# Patient Record
Sex: MALE | Race: WHITE | NOT HISPANIC OR LATINO | Employment: OTHER | URBAN - METROPOLITAN AREA
[De-identification: names, ages, dates, MRNs, and addresses within clinical notes are randomized per-mention and may not be internally consistent; named-entity substitution may affect disease eponyms.]

---

## 2017-02-10 ENCOUNTER — LAB VISIT (OUTPATIENT)
Dept: LAB | Facility: HOSPITAL | Age: 52
End: 2017-02-10
Attending: PSYCHIATRY & NEUROLOGY
Payer: COMMERCIAL

## 2017-02-10 ENCOUNTER — OFFICE VISIT (OUTPATIENT)
Dept: NEUROLOGY | Facility: CLINIC | Age: 52
End: 2017-02-10
Payer: COMMERCIAL

## 2017-02-10 VITALS
WEIGHT: 247.81 LBS | HEIGHT: 70 IN | SYSTOLIC BLOOD PRESSURE: 140 MMHG | BODY MASS INDEX: 35.48 KG/M2 | RESPIRATION RATE: 16 BRPM | DIASTOLIC BLOOD PRESSURE: 80 MMHG | HEART RATE: 88 BPM

## 2017-02-10 DIAGNOSIS — R94.01 ABNORMAL EEG: ICD-10-CM

## 2017-02-10 LAB
ALBUMIN SERPL BCP-MCNC: 3.8 G/DL
ALP SERPL-CCNC: 57 U/L
ALT SERPL W/O P-5'-P-CCNC: 44 U/L
ANION GAP SERPL CALC-SCNC: 8 MMOL/L
AST SERPL-CCNC: 26 U/L
BASOPHILS # BLD AUTO: 0.04 K/UL
BASOPHILS NFR BLD: 0.6 %
BILIRUB SERPL-MCNC: 0.4 MG/DL
BUN SERPL-MCNC: 10 MG/DL
CALCIUM SERPL-MCNC: 8.7 MG/DL
CHLORIDE SERPL-SCNC: 104 MMOL/L
CO2 SERPL-SCNC: 25 MMOL/L
CREAT SERPL-MCNC: 1.1 MG/DL
DIFFERENTIAL METHOD: NORMAL
EOSINOPHIL # BLD AUTO: 0.1 K/UL
EOSINOPHIL NFR BLD: 1.3 %
ERYTHROCYTE [DISTWIDTH] IN BLOOD BY AUTOMATED COUNT: 13.2 %
EST. GFR  (AFRICAN AMERICAN): >60 ML/MIN/1.73 M^2
EST. GFR  (NON AFRICAN AMERICAN): >60 ML/MIN/1.73 M^2
GLUCOSE SERPL-MCNC: 140 MG/DL
HCT VFR BLD AUTO: 43.3 %
HGB BLD-MCNC: 14.7 G/DL
LYMPHOCYTES # BLD AUTO: 1.6 K/UL
LYMPHOCYTES NFR BLD: 24 %
MCH RBC QN AUTO: 28.1 PG
MCHC RBC AUTO-ENTMCNC: 33.9 %
MCV RBC AUTO: 83 FL
MONOCYTES # BLD AUTO: 0.6 K/UL
MONOCYTES NFR BLD: 9.1 %
NEUTROPHILS # BLD AUTO: 4.4 K/UL
NEUTROPHILS NFR BLD: 65 %
PLATELET # BLD AUTO: 333 K/UL
PMV BLD AUTO: 10.4 FL
POTASSIUM SERPL-SCNC: 4.1 MMOL/L
PROT SERPL-MCNC: 8 G/DL
RBC # BLD AUTO: 5.23 M/UL
SODIUM SERPL-SCNC: 137 MMOL/L
TSH SERPL DL<=0.005 MIU/L-ACNC: 2.1 UIU/ML
WBC # BLD AUTO: 6.74 K/UL

## 2017-02-10 PROCEDURE — 99214 OFFICE O/P EST MOD 30 MIN: CPT | Mod: S$GLB,,, | Performed by: PSYCHIATRY & NEUROLOGY

## 2017-02-10 PROCEDURE — 36415 COLL VENOUS BLD VENIPUNCTURE: CPT

## 2017-02-10 PROCEDURE — 3079F DIAST BP 80-89 MM HG: CPT | Mod: S$GLB,,, | Performed by: PSYCHIATRY & NEUROLOGY

## 2017-02-10 PROCEDURE — 99999 PR PBB SHADOW E&M-EST. PATIENT-LVL III: CPT | Mod: PBBFAC,,, | Performed by: PSYCHIATRY & NEUROLOGY

## 2017-02-10 PROCEDURE — 80053 COMPREHEN METABOLIC PANEL: CPT

## 2017-02-10 PROCEDURE — 84443 ASSAY THYROID STIM HORMONE: CPT

## 2017-02-10 PROCEDURE — 85025 COMPLETE CBC W/AUTO DIFF WBC: CPT

## 2017-02-10 PROCEDURE — 3077F SYST BP >= 140 MM HG: CPT | Mod: S$GLB,,, | Performed by: PSYCHIATRY & NEUROLOGY

## 2017-02-10 RX ORDER — LOSARTAN POTASSIUM 100 MG/1
50 TABLET ORAL DAILY
COMMUNITY
Start: 2017-01-24 | End: 2021-11-05

## 2017-02-10 RX ORDER — METFORMIN HYDROCHLORIDE 1000 MG/1
1000 TABLET ORAL 2 TIMES DAILY WITH MEALS
COMMUNITY
Start: 2017-01-12 | End: 2021-11-05

## 2017-02-10 RX ORDER — OXCARBAZEPINE 300 MG/1
300 TABLET, FILM COATED ORAL 2 TIMES DAILY
Qty: 180 TABLET | Refills: 3 | Status: SHIPPED | OUTPATIENT
Start: 2017-02-10 | End: 2018-03-01 | Stop reason: SDUPTHER

## 2017-02-10 RX ORDER — EMPAGLIFLOZIN AND LINAGLIPTIN 10; 5 MG/1; MG/1
1 TABLET, FILM COATED ORAL DAILY
COMMUNITY
Start: 2017-01-17 | End: 2017-03-10 | Stop reason: SDUPTHER

## 2017-02-10 RX ORDER — ASPIRIN 81 MG/1
81 TABLET ORAL DAILY
COMMUNITY
End: 2021-11-05 | Stop reason: SDUPTHER

## 2017-02-10 NOTE — MR AVS SNAPSHOT
[facility]  [address] [facility] [address]  Phone: [phone]  Fax: [phone]                  Georges Carrillo   2/10/2017 8:45 AM   Office Visit    Description:  Male : 1965   Provider:  Payam Us MD   Department:  [facility]           Reason for Visit     Neurologic Problem           Diagnoses this Visit        Comments    Spells    -  Primary     Abnormal EEG                To Do List           Future Appointments        Provider Department Dept Phone    2/10/2017 1:00 PM LAB, [facility] [phone]      Goals (5 Years of Data)     None      Follow-Up and Disposition     Return in about 1 year (around 2/10/2018).      [facility]     [facility] -  Assistance  Registered nurses in the [facility] provide clinical advisement, health education, appointment booking, and other advisory services.  Call for this free service at [phone].             Medications                Verify that the below list of medications is an accurate representation of the medications you are currently taking.  If none reported, the list may be blank. If incorrect, please contact your healthcare provider. Carry this list with you in case of emergency.           Current Medications     amlodipine (NORVASC) 10 MG tablet Take 10 mg by mouth once daily.    aspirin (ECOTRIN) 81 MG EC tablet Take 81 mg by mouth once daily.    diazepam (VALIUM) 5 MG tablet Take 5 mg by mouth every 12 (twelve) hours as needed for Anxiety.    GLYXAMBI 10-5 mg Tab Take 1 tablet by mouth once daily.     hydrocodone-acetaminophen 10-325mg (NORCO)  mg Tab Take 1 tablet by mouth every 6 to 8 hours as needed.    losartan (COZAAR) 100 MG tablet Take 100 mg by mouth once daily.     metformin (GLUCOPHAGE) 1000 MG tablet Take 1,000 mg by mouth 2 (two) times daily with meals.     oxcarbazepine (TRILEPTAL) 300 MG  "Tab TAKE ONE TABLET BY MOUTH TWICE DAILY    tramadol (ULTRAM) 50 mg tablet Take 100 mg by mouth 2 (two) times daily as needed for Pain.            Clinical Reference Information           Your Vitals Were     BP Pulse Resp Height Weight BMI    140/80 (BP Location: Right arm, Patient Position: Sitting, BP Method: Manual) 88 16 5' 10" (1.778 m) 112.4 kg (247 lb 12.8 oz) 35.56 kg/m2      Blood Pressure          Most Recent Value    BP  (!)  140/80      Allergies as of 2/10/2017     No Known Allergies      Immunizations Administered on Date of Encounter - 2/10/2017     None      Orders Placed During Today's Visit     Future Labs/Procedures Expected by Expires    CBC auto differential  2/10/2017 4/11/2018    Comprehensive metabolic panel  2/10/2017 2/10/2018    TSH  2/10/2017 4/11/2018      Language Assistance Services     ATTENTION: Language assistance services are available, free of charge. Please call [phone].      ATENCIÓN: Si habla español, tiene a chery disposición servicios gratuitos de asistencia lingüística. Llame al [phone].     CHÚ Ý: N?u b?n nói Ti?ng Vi?t, có các d?ch v? h? tr? ngôn ng? mi?n phí dành cho b?n. G?i s? [phone].         [facility] complies with applicable Federal civil rights laws and does not discriminate on the basis of race, color, national origin, age, disability, or sex.        "

## 2017-02-10 NOTE — PROGRESS NOTES
HPI:Georges Carrillo is a 50 y.o. male with a few spells of LOC over several years, with warning and one with witnessed tonic-clonic activity. Most recent spell 6/2014. Work up revealed  normal MRI brain EEG showed intermittent left temporal slowing possibly suggesting seizure disorder .    Since the last visit, no seizures, no staring spells and no loss of time.  He continues to be compliant with Triletpal BID  Follows A1C and lipids with endocrinology      Review of Systems   Constitutional: Negative for fever.   Eyes: Negative for double vision.   Respiratory: Negative for hemoptysis.    Cardiovascular: Negative for leg swelling.   Gastrointestinal: Negative for blood in stool.   Genitourinary: Negative for hematuria.   Musculoskeletal: Negative for falls.   Skin: Negative for rash.   Neurological: Negative for seizures, loss of consciousness and headaches.   Psychiatric/Behavioral: Negative for memory loss.             Exam:  Gen Appearance, well developed/nourished in no apparent distress  CV: 2+ distal pulses with no edema or swelling  Neuro:  MS: Awake, alert,  Sustains attention. Recent/remote memory intact, Language is full to spontaneous speech/comprehension. Fund of Knowledge is full and mood is euthymic  CN: PERRL, Extraoccular movements and visual fields are full. Normal facial  strength, Tongue and Palate are midline and strong. Shoulder Shrug symmetric and strong.  Motor: Normal bulk, tone, no abnormal movements. 5/5 strength bilateral upper/lower extremities  Except decreased to 4+/5 left foot eversion (chronic ) with 1+ reflexes and no clonus  Sensory: symmetric to light touch, pain, temp, and vibration/proprioception (except chronic decreased sensation in the legs (left more than right) chronically since prior injury. )  Cerebellar: Finger-nose, Rapid alternating movements intact  Gait:  no ataxia but gait is mildly wide based, chronically     Labs: 12.2015 BMP normal sodium      Assessment/Plan:  Georges Carrillo is a 50 y.o. male with a few spells of LOC over several years, with warning and one with witnessed tonic-clonic activity. Most recent spell 6/2014. Work up revealed  normal MRI brain EEG showed intermittent left temporal slowing suggesting seizure disorder .    I recommend:     1. Continue Trileptal given no further spells on this med. Stop driving and notify me for any further spell.   2. Check CMP , CBC, and TSH today  given trielptal use.  He may have a long standing seizure disorder given prior EEG findings  3. He follows with Dr Ayala for chronic neck/back pain     RTC in 1 year

## 2018-03-02 RX ORDER — OXCARBAZEPINE 300 MG/1
TABLET, FILM COATED ORAL
Qty: 60 TABLET | Refills: 11 | Status: SHIPPED | OUTPATIENT
Start: 2018-03-02 | End: 2018-10-12 | Stop reason: SDUPTHER

## 2018-04-09 ENCOUNTER — OFFICE VISIT (OUTPATIENT)
Dept: NEUROLOGY | Facility: CLINIC | Age: 53
End: 2018-04-09
Payer: COMMERCIAL

## 2018-04-09 ENCOUNTER — LAB VISIT (OUTPATIENT)
Dept: LAB | Facility: HOSPITAL | Age: 53
End: 2018-04-09
Attending: PSYCHIATRY & NEUROLOGY
Payer: COMMERCIAL

## 2018-04-09 VITALS
HEIGHT: 70 IN | DIASTOLIC BLOOD PRESSURE: 80 MMHG | BODY MASS INDEX: 34.18 KG/M2 | RESPIRATION RATE: 16 BRPM | SYSTOLIC BLOOD PRESSURE: 114 MMHG | HEART RATE: 104 BPM | WEIGHT: 238.75 LBS

## 2018-04-09 DIAGNOSIS — R94.01 ABNORMAL EEG: ICD-10-CM

## 2018-04-09 DIAGNOSIS — H73.91 ABNORMAL TYMPANIC MEMBRANE, RIGHT: ICD-10-CM

## 2018-04-09 DIAGNOSIS — H93.A1 PULSATILE TINNITUS OF RIGHT EAR: ICD-10-CM

## 2018-04-09 DIAGNOSIS — H93.A1 PULSATILE TINNITUS OF RIGHT EAR: Primary | ICD-10-CM

## 2018-04-09 LAB
ANION GAP SERPL CALC-SCNC: 10 MMOL/L
BUN SERPL-MCNC: 15 MG/DL
CALCIUM SERPL-MCNC: 10 MG/DL
CHLORIDE SERPL-SCNC: 98 MMOL/L
CO2 SERPL-SCNC: 31 MMOL/L
CREAT SERPL-MCNC: 1.2 MG/DL
EST. GFR  (AFRICAN AMERICAN): >60 ML/MIN/1.73 M^2
EST. GFR  (NON AFRICAN AMERICAN): >60 ML/MIN/1.73 M^2
GLUCOSE SERPL-MCNC: 131 MG/DL
POTASSIUM SERPL-SCNC: 3.5 MMOL/L
SODIUM SERPL-SCNC: 139 MMOL/L

## 2018-04-09 PROCEDURE — 80048 BASIC METABOLIC PNL TOTAL CA: CPT

## 2018-04-09 PROCEDURE — 99214 OFFICE O/P EST MOD 30 MIN: CPT | Mod: S$GLB,,, | Performed by: PSYCHIATRY & NEUROLOGY

## 2018-04-09 PROCEDURE — 3074F SYST BP LT 130 MM HG: CPT | Mod: CPTII,S$GLB,, | Performed by: PSYCHIATRY & NEUROLOGY

## 2018-04-09 PROCEDURE — 99999 PR PBB SHADOW E&M-EST. PATIENT-LVL IV: CPT | Mod: PBBFAC,,, | Performed by: PSYCHIATRY & NEUROLOGY

## 2018-04-09 PROCEDURE — 36415 COLL VENOUS BLD VENIPUNCTURE: CPT

## 2018-04-09 PROCEDURE — 3079F DIAST BP 80-89 MM HG: CPT | Mod: CPTII,S$GLB,, | Performed by: PSYCHIATRY & NEUROLOGY

## 2018-04-09 RX ORDER — CHLORTHALIDONE 25 MG/1
25 TABLET ORAL DAILY
COMMUNITY
End: 2021-11-05

## 2018-04-09 RX ORDER — DOXAZOSIN 2 MG/1
2 TABLET ORAL NIGHTLY
COMMUNITY
End: 2019-11-25

## 2018-04-09 NOTE — PROGRESS NOTES
HPI: Georges Carrillo is a 50 y.o. male with a few spells of LOC over several years, with warning and one with witnessed tonic-clonic activity. Most recent spell 6/2014. Work up revealed  normal MRI brain EEG showed intermittent left temporal slowing suggesting seizure disorder .      He states he saw this cardiologist this past year for pulsating tinnitus and was given doxasin and no relief noted to date. Patient has had this for the past 3 months- no trauma history  He has some difficulty with annoying sensation with noise but not loss of hearing.  No headaches   He is up to date on eye exam  No seizures.   He has not family history aneurysm.  He does not smoke  No recent infections and no blisters on the skin.    Review of Systems   Constitutional: Negative for fever.   HENT: Positive for tinnitus. Negative for nosebleeds.    Eyes: Negative for double vision.   Respiratory: Negative for hemoptysis.    Cardiovascular: Negative for leg swelling.   Gastrointestinal: Negative for blood in stool.   Genitourinary: Negative for hematuria.   Musculoskeletal: Negative for falls.   Skin: Negative for rash.   Neurological: Negative for seizures, loss of consciousness and headaches.   Psychiatric/Behavioral: Negative for memory loss.       Exam:  Gen Appearance, well developed/nourished in no apparent distress  CV: 2+ distal pulses with no edema or swelling  Neuro:  MS: Awake, alert,  Sustains attention. Recent/remote memory intact, Language is full to spontaneous speech/comprehension. Fund of Knowledge is full and mood is euthymic  CN: PERRL, Extraoccular movements and visual fields are full. Normal facial  strength, Tongue and Palate are midline and strong. Shoulder Shrug symmetric and strong.  Motor: Normal bulk, tone, no abnormal movements. 5/5 strength bilateral upper/lower extremities  Except decreased to 4+/5 left foot eversion (chronic ) with 1+ reflexes and no clonus  Sensory: symmetric to light touch, pain, temp, and  vibration/proprioception (except chronic decreased sensation in the legs (left more than right) chronically since prior injury. )  Cerebellar: Finger-nose, Rapid alternating movements intact  Gait:  no ataxia but gait is mildly wide based, chronically   HEENT: TM clear bilaterally except for mild and small blistering on the right ear drum. No active infections.     Labs: 2/2017: CMP, CBC and TSH normal      Assessment/Plan: Georges Carrillo is a 50 y.o. male with a few spells of LOC over several years, with warning and one with witnessed tonic-clonic activity. Most recent spell 6/2014. Work up revealed  normal MRI brain EEG showed intermittent left temporal slowing suggesting seizure disorder .    Now with 3 months of persistent pulsatile tinnitus  I recommend:     1. For new persistent right sided pulsatile tinnitus: MRI brain and MRA brain and Neck. Also needs referral to ENT given TM findings today.   2. Continue Trileptal given no further spells on this med. Stop driving and notify me for any further spell.   3. Check BMP today  given trielptal use. Periodic TSH and CBC/CMP needed in this patient.  He may have a long standing seizure disorder given prior EEG findings  4. He follows with Dr Ayala for chronic neck/back pain     RTC 6 months, but call for above results and any further recommendations.

## 2018-04-16 ENCOUNTER — HOSPITAL ENCOUNTER (OUTPATIENT)
Dept: RADIOLOGY | Facility: HOSPITAL | Age: 53
Discharge: HOME | End: 2018-04-16
Attending: PSYCHIATRY & NEUROLOGY
Payer: COMMERCIAL

## 2018-04-16 DIAGNOSIS — H93.A1 PULSATILE TINNITUS OF RIGHT EAR: ICD-10-CM

## 2018-04-16 PROCEDURE — 70549 MR ANGIOGRAPH NECK W/O&W/DYE: CPT | Mod: TC

## 2018-04-16 PROCEDURE — 70553 MRI BRAIN STEM W/O & W/DYE: CPT | Mod: TC

## 2018-04-16 PROCEDURE — 70549 MR ANGIOGRAPH NECK W/O&W/DYE: CPT | Mod: 26,,, | Performed by: RADIOLOGY

## 2018-04-16 PROCEDURE — A9585 GADOBUTROL INJECTION: HCPCS | Performed by: PSYCHIATRY & NEUROLOGY

## 2018-04-16 PROCEDURE — 70544 MR ANGIOGRAPHY HEAD W/O DYE: CPT | Mod: TC

## 2018-04-16 PROCEDURE — 25500020 PHARM REV CODE 255: Performed by: PSYCHIATRY & NEUROLOGY

## 2018-04-16 PROCEDURE — 70553 MRI BRAIN STEM W/O & W/DYE: CPT | Mod: 26,,, | Performed by: RADIOLOGY

## 2018-04-16 RX ORDER — GADOBUTROL 604.72 MG/ML
10 INJECTION INTRAVENOUS
Status: COMPLETED | OUTPATIENT
Start: 2018-04-16 | End: 2018-04-16

## 2018-04-16 RX ADMIN — GADOBUTROL 10 ML: 604.72 INJECTION INTRAVENOUS at 11:04

## 2018-10-12 ENCOUNTER — OFFICE VISIT (OUTPATIENT)
Dept: NEUROLOGY | Facility: CLINIC | Age: 53
End: 2018-10-12
Payer: COMMERCIAL

## 2018-10-12 VITALS
BODY MASS INDEX: 33.83 KG/M2 | DIASTOLIC BLOOD PRESSURE: 84 MMHG | SYSTOLIC BLOOD PRESSURE: 126 MMHG | HEART RATE: 82 BPM | HEIGHT: 70 IN | WEIGHT: 236.31 LBS | RESPIRATION RATE: 18 BRPM

## 2018-10-12 DIAGNOSIS — H93.13 TINNITUS OF BOTH EARS: ICD-10-CM

## 2018-10-12 DIAGNOSIS — R56.9 SEIZURES: ICD-10-CM

## 2018-10-12 DIAGNOSIS — R94.01 ABNORMAL EEG: Primary | ICD-10-CM

## 2018-10-12 PROCEDURE — 3079F DIAST BP 80-89 MM HG: CPT | Mod: CPTII,S$GLB,, | Performed by: PSYCHIATRY & NEUROLOGY

## 2018-10-12 PROCEDURE — 3008F BODY MASS INDEX DOCD: CPT | Mod: CPTII,S$GLB,, | Performed by: PSYCHIATRY & NEUROLOGY

## 2018-10-12 PROCEDURE — 99999 PR PBB SHADOW E&M-EST. PATIENT-LVL III: CPT | Mod: PBBFAC,,, | Performed by: PSYCHIATRY & NEUROLOGY

## 2018-10-12 PROCEDURE — 3074F SYST BP LT 130 MM HG: CPT | Mod: CPTII,S$GLB,, | Performed by: PSYCHIATRY & NEUROLOGY

## 2018-10-12 PROCEDURE — 99214 OFFICE O/P EST MOD 30 MIN: CPT | Mod: S$GLB,,, | Performed by: PSYCHIATRY & NEUROLOGY

## 2018-10-12 RX ORDER — OXCARBAZEPINE 300 MG/1
300 TABLET, FILM COATED ORAL 2 TIMES DAILY
Qty: 180 TABLET | Refills: 3 | Status: SHIPPED | OUTPATIENT
Start: 2018-10-12 | End: 2019-10-04 | Stop reason: SDUPTHER

## 2018-10-12 NOTE — PROGRESS NOTES
HPI: Georges Carrillo is a 50 y.o. male with a few spells of LOC over several years, with warning and one with witnessed tonic-clonic activity. Most recent spell 6/2014. Work up revealed  normal MRI brain EEG showed intermittent left temporal slowing suggesting seizure disorder .    Now with 3 months of persistent pulsatile tinnitus    The patient states he saw ENT since the last visit. He states no cause was found.    He had an eye exam last month which showed normal fundoscopic exam    Currently, the patient states the tinnitus is greatly improved but is mild bilateral and on both sides    No further seizures, staring spells, or loss of time.    He has some rare neck pain , s/p ACDF which is not radicular. No numbness or weakness in the arms. He does uses tramadol vs hydrocodone per Dr Ayala for this pain PRN.       Review of Systems   Constitutional: Negative for fever.   HENT: Positive for tinnitus. Negative for nosebleeds.    Eyes: Negative for double vision.   Respiratory: Negative for hemoptysis.    Cardiovascular: Negative for leg swelling.   Gastrointestinal: Negative for blood in stool.   Genitourinary: Negative for hematuria.   Musculoskeletal: Negative for falls.   Skin: Negative for rash.   Neurological: Negative for seizures, loss of consciousness and headaches.   Psychiatric/Behavioral: Negative for memory loss.       Exam:  Gen Appearance, well developed/nourished in no apparent distress  CV: 2+ distal pulses with no edema or swelling  Neuro:  MS: Awake, alert,  Sustains attention. Recent/remote memory intact, Language is full to spontaneous speech/comprehension. Fund of Knowledge is full and mood is euthymic  CN: PERRL, Extraoccular movements and visual fields are full. Normal facial  strength, Tongue and Palate are midline and strong. Shoulder Shrug symmetric and strong.  Motor: Normal bulk, tone, no abnormal movements. 5/5 strength bilateral upper/lower extremities  Except decreased to 4+/5 left  foot eversion (chronic ) with 1+ reflexes and no clonus  Sensory: symmetric to light touch, pain, temp, and vibration/proprioception (except chronic decreased sensation in the legs (left more than right) chronically since prior injury. )  Cerebellar: Finger-nose, Rapid alternating movements intact  Gait:  no ataxia but gait is mildly wide based, chronically       Labs: 2018 BMP unremarkable  Imagin2018 MRI brain: Few scattered punctate foci of T2/FLAIR signal abnormality in the supratentorial white matter without corresponding diffusion restriction or postcontrast enhancement.  This is most commonly seen in the setting of mild chronic microvascular ischemic change.  No evidence for an acute infarction or enhancing lesion.    2018 MRA neck: No significant arterial abnormalities.  2018 MRA brain: No significant arterial abnormalities.      Assessment/Plan: Georges Carrillo is a 50 y.o. male with a few spells of LOC over several years, with warning and one with witnessed tonic-clonic activity. Most recent spell 2014. Work up revealed  normal MRI brain EEG showed intermittent left temporal slowing suggesting seizure disorder .    In 2018:  Persistent  pulsatile tinnitus (now bilateral and non-pulsitile) with unremarkable MRI brain and MRA brain and neck  I recommend:     1. He was referred to ENT given tinnitus with normal MRI studies. He states no cause was found.  He had an eye exam last month which showed normal fundoscopic exam. He states the tinnitus is currently greatly improved.  2. Continue Trileptal 300mg BID given no further spells on this med. Stop driving and notify me for any further spell. BMP unremarkable 2018. Periodic TSH and CMP needed in this patient (pending labs with PCP).  He may have a long standing seizure disorder given prior EEG findings  3. He follows with Dr Ayala for chronic neck/back pain s/p ACDF and back surgery     RTC 1 year

## 2019-10-07 RX ORDER — OXCARBAZEPINE 300 MG/1
300 TABLET, FILM COATED ORAL 2 TIMES DAILY
Qty: 180 TABLET | Refills: 3 | Status: SHIPPED | OUTPATIENT
Start: 2019-10-07 | End: 2019-11-25 | Stop reason: SDUPTHER

## 2019-11-25 ENCOUNTER — LAB VISIT (OUTPATIENT)
Dept: LAB | Facility: HOSPITAL | Age: 54
End: 2019-11-25
Attending: PSYCHIATRY & NEUROLOGY
Payer: COMMERCIAL

## 2019-11-25 ENCOUNTER — OFFICE VISIT (OUTPATIENT)
Dept: NEUROLOGY | Facility: CLINIC | Age: 54
End: 2019-11-25
Payer: COMMERCIAL

## 2019-11-25 VITALS
HEIGHT: 70 IN | SYSTOLIC BLOOD PRESSURE: 130 MMHG | BODY MASS INDEX: 33.68 KG/M2 | HEART RATE: 78 BPM | WEIGHT: 235.25 LBS | RESPIRATION RATE: 18 BRPM | DIASTOLIC BLOOD PRESSURE: 88 MMHG

## 2019-11-25 DIAGNOSIS — R56.9 SEIZURES: ICD-10-CM

## 2019-11-25 DIAGNOSIS — R74.8 ELEVATED LIVER ENZYMES: ICD-10-CM

## 2019-11-25 DIAGNOSIS — R94.01 ABNORMAL EEG: Primary | ICD-10-CM

## 2019-11-25 DIAGNOSIS — H93.13 TINNITUS OF BOTH EARS: ICD-10-CM

## 2019-11-25 LAB
ALBUMIN SERPL BCP-MCNC: 4.1 G/DL (ref 3.5–5.2)
ALP SERPL-CCNC: 62 U/L (ref 55–135)
ALT SERPL W/O P-5'-P-CCNC: 48 U/L (ref 10–44)
AST SERPL-CCNC: 26 U/L (ref 10–40)
BILIRUB DIRECT SERPL-MCNC: 0.2 MG/DL (ref 0.1–0.3)
BILIRUB SERPL-MCNC: 0.5 MG/DL (ref 0.1–1)
PROT SERPL-MCNC: 8.2 G/DL (ref 6–8.4)

## 2019-11-25 PROCEDURE — 3079F DIAST BP 80-89 MM HG: CPT | Mod: CPTII,S$GLB,, | Performed by: PSYCHIATRY & NEUROLOGY

## 2019-11-25 PROCEDURE — 3075F PR MOST RECENT SYSTOLIC BLOOD PRESS GE 130-139MM HG: ICD-10-PCS | Mod: CPTII,S$GLB,, | Performed by: PSYCHIATRY & NEUROLOGY

## 2019-11-25 PROCEDURE — 3079F PR MOST RECENT DIASTOLIC BLOOD PRESSURE 80-89 MM HG: ICD-10-PCS | Mod: CPTII,S$GLB,, | Performed by: PSYCHIATRY & NEUROLOGY

## 2019-11-25 PROCEDURE — 36415 COLL VENOUS BLD VENIPUNCTURE: CPT

## 2019-11-25 PROCEDURE — 99999 PR PBB SHADOW E&M-EST. PATIENT-LVL III: CPT | Mod: PBBFAC,,, | Performed by: PSYCHIATRY & NEUROLOGY

## 2019-11-25 PROCEDURE — 80076 HEPATIC FUNCTION PANEL: CPT

## 2019-11-25 PROCEDURE — 99214 PR OFFICE/OUTPT VISIT, EST, LEVL IV, 30-39 MIN: ICD-10-PCS | Mod: S$GLB,,, | Performed by: PSYCHIATRY & NEUROLOGY

## 2019-11-25 PROCEDURE — 3008F PR BODY MASS INDEX (BMI) DOCUMENTED: ICD-10-PCS | Mod: CPTII,S$GLB,, | Performed by: PSYCHIATRY & NEUROLOGY

## 2019-11-25 PROCEDURE — 3008F BODY MASS INDEX DOCD: CPT | Mod: CPTII,S$GLB,, | Performed by: PSYCHIATRY & NEUROLOGY

## 2019-11-25 PROCEDURE — 99214 OFFICE O/P EST MOD 30 MIN: CPT | Mod: S$GLB,,, | Performed by: PSYCHIATRY & NEUROLOGY

## 2019-11-25 PROCEDURE — 3075F SYST BP GE 130 - 139MM HG: CPT | Mod: CPTII,S$GLB,, | Performed by: PSYCHIATRY & NEUROLOGY

## 2019-11-25 PROCEDURE — 99999 PR PBB SHADOW E&M-EST. PATIENT-LVL III: ICD-10-PCS | Mod: PBBFAC,,, | Performed by: PSYCHIATRY & NEUROLOGY

## 2019-11-25 RX ORDER — OXCARBAZEPINE 300 MG/1
300 TABLET, FILM COATED ORAL 2 TIMES DAILY
Qty: 180 TABLET | Refills: 3 | Status: SHIPPED | OUTPATIENT
Start: 2019-11-25 | End: 2020-12-16

## 2019-11-25 NOTE — PROGRESS NOTES
HPI: Georges Carrillo is a 50 y.o. male with a few spells of LOC over several years, with warning and one with witnessed tonic-clonic activity. Most recent spell 6/2014. Work up revealed  normal MRI brain EEG showed intermittent left temporal slowing suggesting seizure disorder .    Now with 3 months of persistent pulsatile tinnitus    The patient is here for his yearly follow up.    No seizures, staring spells and no loss of time  Tinnitus is still improved.       The patient continues to see Dr Ayala for his pain. He states he has been using CBD oil this year with some relief.    Review of Systems   Constitutional: Negative for fever.   HENT: Positive for tinnitus. Negative for nosebleeds.    Eyes: Negative for double vision.   Respiratory: Negative for hemoptysis.    Cardiovascular: Negative for leg swelling.   Gastrointestinal: Negative for blood in stool.   Genitourinary: Negative for hematuria.   Musculoskeletal: Negative for falls.   Skin: Negative for rash.   Neurological: Negative for seizures and loss of consciousness.   Psychiatric/Behavioral: Negative for memory loss.       Exam:  Gen Appearance, well developed/nourished in no apparent distress  CV: 2+ distal pulses with no edema or swelling  Neuro:  MS: Awake, alert,  Sustains attention. Recent/remote memory intact, Language is full to spontaneous speech/comprehension. Fund of Knowledge is full and mood is euthymic  CN: PERRL, Extraoccular movements and visual fields are full. Normal facial  strength, Tongue and Palate are midline and strong. Shoulder Shrug symmetric and strong.  Motor: Normal bulk, tone, no abnormal movements. 5/5 strength bilateral upper/lower extremities  Except decreased to 4+/5 left foot eversion (chronic ) with 1+ reflexes and no clonus  Sensory: symmetric to light touch, pain, temp, and vibration/proprioception (except chronic decreased sensation in the legs (left more than right) chronically since prior injury. )  Cerebellar:  Finger-nose, Rapid alternating movements intact  Gait:  no ataxia but gait is mildly wide based, chronically       Labs:2019 CBC normal, Sodium normal on CMP, TSH dio, but there was some mild elevation of LFTs (treated for dehydration at that time)    Imagin2018 MRI brain: Few scattered punctate foci of T2/FLAIR signal abnormality in the supratentorial white matter without corresponding diffusion restriction or postcontrast enhancement.  This is most commonly seen in the setting of mild chronic microvascular ischemic change.  No evidence for an acute infarction or enhancing lesion.    2018 MRA neck: No significant arterial abnormalities.  2018 MRA brain: No significant arterial abnormalities.      Assessment/Plan: Georges Carrillo is a 54 y.o. male  with a few spells of LOC over several years, with warning and one with witnessed tonic-clonic activity. Most recent spell 2014. Work up revealed  normal MRI brain EEG showed intermittent left temporal slowing suggesting seizure disorder .    In 2018:  Persistent  pulsatile tinnitus (now bilateral and non-pulsitile) with unremarkable MRI brain and MRA brain and neck  I recommend:     1. He was referred to ENT given tinnitus with normal MRI studies. He states no cause was found.  He had an eye exam in 2019 which showed normal fundoscopic exam. He states the tinnitus is currently greatly improved.  2. Continue Trileptal 300mg BID given no further spells on this med.  Periodic TSH, CBC and CMP needed in this patient.   -LFTs today given elevated findings by labs prior (during dehydration)  - He may have a long standing seizure disorder given prior EEG findings. Stop driving and notify me for any further spells  3. He follows with Dr Ayala for chronic neck/back pain s/p ACDF and back surgery     RTC 1 year